# Patient Record
Sex: MALE | Race: OTHER
[De-identification: names, ages, dates, MRNs, and addresses within clinical notes are randomized per-mention and may not be internally consistent; named-entity substitution may affect disease eponyms.]

---

## 2019-04-01 ENCOUNTER — HOSPITAL ENCOUNTER (EMERGENCY)
Dept: HOSPITAL 61 - ER | Age: 39
Discharge: HOME | End: 2019-04-01
Payer: SELF-PAY

## 2019-04-01 VITALS — BODY MASS INDEX: 38.65 KG/M2 | HEIGHT: 68 IN | WEIGHT: 255 LBS

## 2019-04-01 VITALS — DIASTOLIC BLOOD PRESSURE: 60 MMHG | SYSTOLIC BLOOD PRESSURE: 119 MMHG
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
## 2019-04-01 DIAGNOSIS — S61.011A: Primary | ICD-10-CM
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
## 2019-04-01 PROCEDURE — 99283 EMERGENCY DEPT VISIT LOW MDM: CPT

## 2019-04-01 PROCEDURE — 12001 RPR S/N/AX/GEN/TRNK 2.5CM/<: CPT

## 2019-04-01 PROCEDURE — 90471 IMMUNIZATION ADMIN: CPT

## 2019-04-01 PROCEDURE — 90715 TDAP VACCINE 7 YRS/> IM: CPT

## 2019-04-01 PROCEDURE — 73140 X-RAY EXAM OF FINGER(S): CPT

## 2019-04-01 NOTE — PHYS DOC
Adult General


Chief Complaint


Chief Complaint:  LACERATION/AVULSION





HPI


HPI





Patient is a 38  year old male with no significant medical history who presents 

to the ED today complaining of right thumb laceration, patient states he was 

cutting tree branches with a knife when the knife accidentally cut him. He is 

left-handed.


 (IVÁN MICHAELS)





Review of Systems


Review of Systems





Constitutional: Denies fever or chills []


Musculoskeletal: Denies back pain or joint pain []


Integument: Reports right thumb laceration


Neurologic: Denies headache, focal weakness or sensory changes []





All other systems were reviewed and found to be within normal limits, except as 

documented in this note.


 (IVÁN MICHAELS)





Current Medications


Current Medications





Current Medications








 Medications


  (Trade)  Dose


 Ordered  Sig/Isabell  Start Time


 Stop Time Status Last Admin


Dose Admin


 


 Diphtheria/


 Tetanus/Acell


 Pertussis


  (Boostrix)  0.5 ml  ONCE ONCE  4/1/19 19:00


 4/1/19 19:01 DC 4/1/19 18:49


0.5 ML


 


 Lidocaine/Sodium


 Bicarbonate


  (Buffered


 Lidocaine 1%)  3 ml  1X  ONCE  4/1/19 19:00


 4/1/19 19:01 DC 4/1/19 18:47


3 ML





 (JOSE JONES DO)





Allergies


Allergies





Allergies








Coded Allergies Type Severity Reaction Last Updated Verified


 


  No Known Drug Allergies    4/1/19 No





 (JOSE JONES DO)





Physical Exam


Physical Exam





Constitutional: Well developed, well nourished, no acute distress, non-toxic 

appearance. []


Skin: Right thumb at the PIP joint with a laceration approximately 2 cm long, 

there is no obvious tendon involvement. Patient able to flex and extend the 

finger with no difficulties. Adequate radius sensation to the right thumb. +2 

right radial pulse. Cap refill less than 2 seconds the right thumb.


Back: No tenderness, no CVA tenderness. [] 


Extremities: No tenderness, no cyanosis, no clubbing, ROM intact, no edema. [] 


Neurologic: Alert and oriented X 3, normal motor function, normal sensory 

function, no focal deficits noted. []


Psychologic: Affect normal, judgement normal, mood normal. []


 (IVÁN MICHAELS)





Current Patient Data


Vital Signs





 Vital Signs








  Date Time  Temp Pulse Resp B/P (MAP) Pulse Ox O2 Delivery O2 Flow Rate FiO2


 


4/1/19 18:33 98.4 64 18 119/60 (79) 99 Room Air  





 98.4       





 (JOSE JONES DO)





EKG


EKG


[]


 (IVÁN MICHAELS)





Radiology/Procedures


Radiology/Procedures


Laceration/Wound Repair


   Wound Location: Right thumb laceration


   Wound's Depth, Shape: Horizontal


   Wound Length (cm): Approximately 2 cm


   Wound Explored:  clean


   Irrigated w/ Saline (ccs):250


   Betadine Prep?:  y


   Anesthesia:  1% of buffered lidocaine


   Volume Anesthetic (ccs):  approx. 3 cc]


   Wound Repaired With: Ethilon


   Suture Size/Type: 5.0/interrupted sutures


   Number of Sutures: 6


Progress  : Wound was covered with nonstick dressing


 (IVÁN MICHAELS)





Course & Med Decision Making


Course & Med Decision Making


Pertinent Labs and Imaging studies reviewed. (See chart for details)





Patient has right thumb laceration. Right thumb x-rays interpreted by 

radiologist are negative for any acute findings. Laceration was closed by me as 

noted in procedures. Tetanus updated. Wound care instructions and return 

precautions provided.








 (IVÁN MICHAELS)





Dragon Disclaimer


Dragon Disclaimer


This electronic medical record was generated, in whole or in part, using a 

voice recognition dictation system.


 (IVÁN MICHAELS)





Departure


Departure


Impression:  


 Primary Impression:  


 Finger laceration


Disposition:  01 HOME, SELF-CARE


Condition:  STABLE


Referrals:  


NO PCP (PCP)


Follow up with the ED or your doctor in 7-10 days for suture removal


Patient Instructions:  Fingertip Laceration





Additional Instructions:  


Your laceration was closed with stitches, you can shower, keep the area clean 

and dry. Apply Neosporin to the area twice a day until stitches are removed. 

Monitor the area for any worsening condition including but not limited to 

increased redness to the area, warmth to the area, yellow drainage to the area 

and return to the ED if they occur. Follow-up with the emergency room or your 

own doctor in 7-10 days for stitches removal.





Attending Signature


Attending Signature


I have reviewed the PA/NP's note and plan of care. I was available for 

consultation as needed during the patient's visit in the emergency department. 

I agree with the clinical impression, plan, and disposition.


 (JOSE JONES DO)





Problem Qualifiers








 Primary Impression:  


 Finger laceration


 Encounter type:  initial encounter  Finger:  thumb  Damage to nail status:  

without damage  Foreign body presence:  without foreign body  Laterality:  

right  Qualified Codes:  S61.011A - Laceration without foreign body of right 

thumb without damage to nail, initial encounter








IVÁN MICHAELS Apr 1, 2019 18:37


JOSE JONES DO Apr 21, 2019 12:26

## 2019-04-01 NOTE — RAD
Indication: Right thumb laceration

 

TECHNIQUE: 3 views of the right thumb

 

COMPARISON: None

 

Findings/

impression:

No acute fracture or dislocation. Mild osteoarthritis at the 

interphalangeal joint.

 

Electronically signed by: Yunior Coates DO (4/1/2019 7:25 PM) [facility]